# Patient Record
Sex: MALE | Race: BLACK OR AFRICAN AMERICAN | Employment: PART TIME | URBAN - METROPOLITAN AREA
[De-identification: names, ages, dates, MRNs, and addresses within clinical notes are randomized per-mention and may not be internally consistent; named-entity substitution may affect disease eponyms.]

---

## 2017-10-23 ENCOUNTER — HOSPITAL ENCOUNTER (OUTPATIENT)
Dept: LAB | Age: 27
Discharge: HOME | End: 2017-10-23
Payer: SELF-PAY

## 2017-10-23 ENCOUNTER — OFFICE VISIT (OUTPATIENT)
Dept: FAMILY MEDICINE CLINIC | Age: 27
End: 2017-10-23

## 2017-10-23 VITALS
RESPIRATION RATE: 18 BRPM | WEIGHT: 172 LBS | BODY MASS INDEX: 27 KG/M2 | OXYGEN SATURATION: 98 % | SYSTOLIC BLOOD PRESSURE: 115 MMHG | HEART RATE: 64 BPM | TEMPERATURE: 98 F | HEIGHT: 67 IN | DIASTOLIC BLOOD PRESSURE: 76 MMHG

## 2017-10-23 DIAGNOSIS — Z87.898 HISTORY OF SEIZURE: ICD-10-CM

## 2017-10-23 DIAGNOSIS — Z13.0 SCREENING FOR ENDOCRINE, METABOLIC AND IMMUNITY DISORDER: ICD-10-CM

## 2017-10-23 DIAGNOSIS — Z00.00 HEALTH MAINTENANCE EXAMINATION: Primary | ICD-10-CM

## 2017-10-23 DIAGNOSIS — Z13.228 SCREENING FOR ENDOCRINE, METABOLIC AND IMMUNITY DISORDER: ICD-10-CM

## 2017-10-23 DIAGNOSIS — Z13.220 SCREENING FOR LIPID DISORDERS: ICD-10-CM

## 2017-10-23 DIAGNOSIS — G80.9 CEREBRAL PALSY, UNSPECIFIED TYPE (HCC): ICD-10-CM

## 2017-10-23 DIAGNOSIS — Z20.2 POSSIBLE EXPOSURE TO STD: ICD-10-CM

## 2017-10-23 DIAGNOSIS — Z13.29 SCREENING FOR ENDOCRINE, METABOLIC AND IMMUNITY DISORDER: ICD-10-CM

## 2017-10-23 PROCEDURE — 87491 CHLMYD TRACH DNA AMP PROBE: CPT | Performed by: PHYSICIAN ASSISTANT

## 2017-10-23 NOTE — PROGRESS NOTES
Patient: Fauzia Motley MRN: [id number]  SSN: [id number]    YOB: 1990  Age: 24 y.o. Sex: male      Date of Service: 10/23/2017   Provider: Sue Dupree   Office Location:   [address] [facility], [address] [place] [address], [address]  Office Phone: [phone]  Office Fax: [phone]        REASON FOR VISIT:   Chief Complaint   Patient presents with    Seizure     Pt. presents without any complaints or signs and symptoms. Pt. states his last siezure was at the age of 11 yrs. old. Patient would like to list what his limitations he would have pertaining to  work. VITALS:   Visit Vitals    /76 (BP 1 Location: Right arm, BP Patient Position: Sitting)    Pulse 64    Temp 98 °F (36.7 °C) (Oral)    Resp 18    Ht 5' 7\" (1.702 m)    Wt 172 lb (78 kg)    SpO2 98%    BMI 26.94 kg/m2       MEDICATIONS:   No current medications     ALLERGIES:   No Known Allergies     ACTIVE MEDICAL PROBLEMS:  Patient Active Problem List   Diagnosis Code    Cerebral palsy ([facility]) G80.9    Advanced directives, counseling/discussion Z71.89        MEDICAL/SURGICAL HISTORY:  Past Medical History:   Diagnosis Date    Cerebral palsy ([address] 1990    Epilepsy ([facility]     not on medications since age 11. Seizure free since age 11.    Seizures ([address]       History reviewed. No pertinent surgical history. FAMILY HISTORY:  History reviewed. No pertinent family history. SOCIAL HISTORY:  Social History   Substance Use Topics    Smoking status: Never Smoker    Smokeless tobacco: Never Used    Alcohol use No      Comment: use to        HISTORY OF PRESENT ILLNESS:   Fauzia Motley is a 24 y.o. male who presents to the office for a routine physical exam.    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  These conditions were diagnosed as a child, and patient reports he has not had a seizure since age 11. He is not currently on any medications. Unfortunately, no prior records are available. REVIEW OF SYSTEMS:  Review of Systems   Constitutional: Negative for chills and fever. HENT: Negative for congestion and sore throat. Eyes: Negative for blurred vision and double vision. Respiratory: Negative for cough, shortness of breath and wheezing. Cardiovascular: Negative for chest pain and palpitations. Gastrointestinal: Negative for abdominal pain, constipation, diarrhea and nausea. Genitourinary: Negative for frequency and urgency. Skin: Negative for itching and rash. Neurological: Negative for dizziness and headaches. PHYSICAL EXAMINATION:  Physical Exam   Constitutional: He is oriented to person, place, and time and well-developed, well-nourished, and in no distress. HENT:   Head: Normocephalic and atraumatic. Right Ear: External ear normal.   Left Ear: External ear normal.   Mouth/Throat: Oropharynx is clear and moist.   Eyes: Conjunctivae and EOM are normal. Pupils are equal, round, and reactive to light. Neck: Neck supple. No thyromegaly present. Cardiovascular: Normal rate, regular rhythm and normal heart sounds. Exam reveals no gallop and no friction rub. No murmur heard. Pulmonary/Chest: Effort normal and breath sounds normal. He has no wheezes. He has no rales. Abdominal: Soft. Bowel sounds are normal. He exhibits no distension and no mass. There is no tenderness. There is no rebound and no guarding. Lymphadenopathy:     He has no cervical adenopathy. Neurological: He is alert and oriented to person, place, and time. Gait normal.   Skin: Skin is warm and dry. No rash noted. Psychiatric: Mood, memory and affect normal.        RESULTS:  No results found for this visit on 10/23/17. ASSESSMENT/PLAN:  Diagnoses and all orders for this visit:    1.  Health maintenance examination  - Essentially normal physical exam findings today - Cleared for work from my perspective, but see below regarding hx of CP and epilsepsy  - Flu shot declined today     2. History of seizure  3. Cerebral palsy, unspecified type ([facility] [address] [address]  - Patient reports history of epilepsy and CP and needs clearance to work   - Will refer to neurology for further evaluation   -     Jacquelyn Neuro ref [facility]    4. Screening for lipid disorders  -     LIPID PANEL; Future    5. Screening for endocrine, metabolic and immunity disorder  -     METABOLIC PANEL, COMPREHENSIVE; Future    6. Possible exposure to STD  -     CHLAMYDIA/NEISSERIA/TRICHOMONAS AMP; Future  -     HIV 1/2 AG/AB, 4TH GENERATION,W RFLX CONFIRM; Future  -     RPR; Future    Check routine labs and STD screening    Follow up annually or as needed    Patient expresses understanding and is agreeable with the above plan.         PATIENT CARE TEAM:   Patient Care Team:  ALAINA Arriaza as PCP - General (Physician Assistant)       ALAINA Arriaza   October 23, 2017    12:06 PM

## 2017-10-23 NOTE — LETTER
10/23/2017 11:57 AM 
 
Mr. Heidi Green [address] [facility] [address] To Whom It May Concern: 
 
Anatoly Grey was seen in the office today for a routine physical exam. By my assessment, there are no current physical barriers to employment, however regarding his history of cerebral palsy and epilepsy, he has been referred to neurology for further testing.   
 
 
Sincerely, 
 
 
Randye Merlin, PA

## 2017-10-23 NOTE — MR AVS SNAPSHOT
Visit Information Date & Time Provider Department Dept. Phone Encounter #  
 10/23/2017 11:30 AM Mahamed CurrieDany [phone] [id number] Upcoming Health Maintenance Date Due DTaP/Tdap/Td series (3 - Td) 10/31/2016 Allergies as of 10/23/2017  Review Complete On: 10/23/2017 By: ALAINA Betancur No Known Allergies Current Immunizations  Reviewed on 6/8/2016 Name Date DTP 4/28/1994, 8/5/1993, 5/18/1993, 1990 DTaP 9/3/1996 Hep B Vaccine 10/31/2006, 9/2/2005, 9/3/1996 Hib 4/28/1994, 8/5/1993, 5/18/1993, 1992 MMR 9/3/1996, 4/28/1994 Poliovirus vaccine 9/3/1996, 4/28/1994, 5/18/1993, 1992 TB Skin Test (PPD) 9/9/2002, 4/28/1994 Tdap 10/31/2006 Not reviewed this visit You Were Diagnosed With   
  
 Codes Comments Health maintenance examination    -  Primary ICD-10-CM: Z00.00 ICD-9-CM: V70.0 History of seizure     ICD-10-CM: Z87.898 ICD-9-CM: V12.49 Cerebral palsy, unspecified type ([facility]     ICD-10-CM: G80.9 ICD-9-CM: 343.9 Screening for lipid disorders     ICD-10-CM: Z13.220 ICD-9-CM: V77.91 Screening for endocrine, metabolic and immunity disorder     ICD-10-CM: Z13.29, Z13.228, Z13.0 ICD-9-CM: V77.99 Possible exposure to STD     ICD-10-CM: Z20.2 ICD-9-CM: V01.6 Vitals BP Pulse Temp Resp Height(growth percentile) Weight(growth percentile) 115/76 (BP 1 Location: Right arm, BP Patient Position: Sitting) 64 98 °F (36.7 °C) (Oral) 18 5' 7\" (1.702 m) 172 lb (78 kg) SpO2 BMI Smoking Status 98% 26.94 kg/m2 Never Smoker BMI and BSA Data Body Mass Index Body Surface Area  
 26.94 kg/m 2 1.92 m 2 Your Updated Medication List  
  
Notice  As of 10/23/2017 12:03 PM  
 You have not been prescribed any medications. We Performed the Following REFERRAL TO NEUROLOGY [CKM35 Custom] To-Do List   
 10/23/2017 Lab:  CHLAMYDIA/NEISSERIA/TRICHOMONAS AMP   
  
 10/23/2017 Lab:  HIV 1/2 AG/AB, 4TH GENERATION,W RFLX CONFIRM   
  
 10/23/2017 Lab:  METABOLIC PANEL, COMPREHENSIVE   
  
 10/23/2017 Lab:  RPR Around 10/24/2017 Lab:  LIPID PANEL Referral Information Referral ID Referred By Referred To  
  
 [id number] Sammie Rubalcava MD   
   [address] [facility] [place], [address] Phone: [phone] Fax: [phone] Visits Status Start Date End Date 1 New Request 10/23/17 10/23/18 If your referral has a status of pending review or denied, additional information will be sent to support the outcome of this decision. Introducing [facility]! Wendy Calderon introduces [employer] patient portal. Now you can access parts of your medical record, email your doctor's office, and request medication refills online. 1. In your internet browser, go to [email or web address] 2. Click on the First Time User? Click Here link in the Sign In box. You will see the New Member Sign Up page. 3. Enter your [employer] Access Code exactly as it appears below. You will not need to use this code after youve completed the sign-up process. If you do not sign up before the expiration date, you must request a new code. · [employer] Access Code: [email or web address] Expires: 1/17/2018 10:33 AM 
 
4. Enter the last four digits of your Social Security Number (xxxx) and Date of Birth (mm/dd/yyyy) as indicated and click Submit. You will be taken to the next sign-up page. 5. Create a [employer] ID. This will be your [employer] login ID and cannot be changed, so think of one that is secure and easy to remember. 6. Create a [employer] password. You can change your password at any time. 7. Enter your Password Reset Question and Answer. This can be used at a later time if you forget your password. 8. Enter your e-mail address. You will receive e-mail notification when new information is available in [address] 9. Click Sign Up. You can now view and download portions of your medical record. 10. Click the Download Summary menu link to download a portable copy of your medical information. If you have questions, please visit the Frequently Asked Questions section of the [employer] website. Remember, [employer] is NOT to be used for urgent needs. For medical emergencies, dial 911. Now available from your iPhone and Android! Please provide this summary of care documentation to your next provider. Your primary care clinician is listed as Nely Chanel. If you have any questions after today's visit, please call [phone].

## 2017-10-25 LAB
C TRACH RRNA SPEC QL NAA+PROBE: NEGATIVE
N GONORRHOEA RRNA SPEC QL NAA+PROBE: NEGATIVE
SPECIMEN SOURCE: NORMAL
T VAGINALIS RRNA SPEC QL NAA+PROBE: NEGATIVE